# Patient Record
Sex: FEMALE | Race: WHITE | NOT HISPANIC OR LATINO | URBAN - METROPOLITAN AREA
[De-identification: names, ages, dates, MRNs, and addresses within clinical notes are randomized per-mention and may not be internally consistent; named-entity substitution may affect disease eponyms.]

---

## 2017-01-31 ENCOUNTER — OFFICE VISIT (OUTPATIENT)
Dept: URGENT CARE | Facility: MEDICAL CENTER | Age: 57
End: 2017-01-31
Payer: COMMERCIAL

## 2017-01-31 PROCEDURE — 99213 OFFICE O/P EST LOW 20 MIN: CPT

## 2017-05-23 ENCOUNTER — GENERIC CONVERSION - ENCOUNTER (OUTPATIENT)
Dept: OTHER | Facility: OTHER | Age: 57
End: 2017-05-23

## 2017-05-26 ENCOUNTER — GENERIC CONVERSION - ENCOUNTER (OUTPATIENT)
Dept: OTHER | Facility: OTHER | Age: 57
End: 2017-05-26

## 2017-06-08 ENCOUNTER — GENERIC CONVERSION - ENCOUNTER (OUTPATIENT)
Dept: OTHER | Facility: OTHER | Age: 57
End: 2017-06-08

## 2017-06-16 ENCOUNTER — GENERIC CONVERSION - ENCOUNTER (OUTPATIENT)
Dept: OTHER | Facility: OTHER | Age: 57
End: 2017-06-16

## 2023-11-08 ENCOUNTER — APPOINTMENT (OUTPATIENT)
Dept: RADIOLOGY | Facility: MEDICAL CENTER | Age: 63
End: 2023-11-08
Payer: COMMERCIAL

## 2023-11-08 ENCOUNTER — OFFICE VISIT (OUTPATIENT)
Dept: URGENT CARE | Facility: MEDICAL CENTER | Age: 63
End: 2023-11-08
Payer: COMMERCIAL

## 2023-11-08 VITALS
DIASTOLIC BLOOD PRESSURE: 80 MMHG | BODY MASS INDEX: 31.89 KG/M2 | SYSTOLIC BLOOD PRESSURE: 150 MMHG | HEIGHT: 63 IN | TEMPERATURE: 97.7 F | HEART RATE: 87 BPM | OXYGEN SATURATION: 97 % | RESPIRATION RATE: 18 BRPM | WEIGHT: 180 LBS

## 2023-11-08 DIAGNOSIS — S60.011A CONTUSION OF RIGHT THUMB WITHOUT DAMAGE TO NAIL, INITIAL ENCOUNTER: Primary | ICD-10-CM

## 2023-11-08 DIAGNOSIS — S69.91XA INJURY OF RIGHT THUMB, INITIAL ENCOUNTER: ICD-10-CM

## 2023-11-08 PROCEDURE — 73140 X-RAY EXAM OF FINGER(S): CPT

## 2023-11-08 PROCEDURE — 99213 OFFICE O/P EST LOW 20 MIN: CPT | Performed by: PHYSICIAN ASSISTANT

## 2023-11-08 RX ORDER — LOSARTAN POTASSIUM AND HYDROCHLOROTHIAZIDE 25; 100 MG/1; MG/1
1 TABLET ORAL DAILY
COMMUNITY

## 2023-11-08 RX ORDER — ESCITALOPRAM OXALATE 20 MG/1
20 TABLET ORAL DAILY
COMMUNITY
Start: 2023-10-07

## 2023-11-08 RX ORDER — ROSUVASTATIN CALCIUM 10 MG/1
TABLET, COATED ORAL
COMMUNITY

## 2023-11-08 RX ORDER — PANTOPRAZOLE SODIUM 40 MG/1
40 TABLET, DELAYED RELEASE ORAL DAILY
COMMUNITY
Start: 2023-10-19

## 2023-11-08 NOTE — PATIENT INSTRUCTIONS
Contusion right thumb  Rest, ice, elevate, over-the-counter pain medicine as needed  Follow-up with Ortho  Follow up with PCP in 3-5 days. Proceed to  ER if symptoms worsen.

## 2023-11-08 NOTE — PROGRESS NOTES
[facility]        NAME: Lloyd Lee is a 59 y.o. female  : 1960    MRN: [id number]  DATE: 2023  TIME: 5:50 PM    Assessment and Plan   Contusion of right thumb without damage to nail, initial encounter [S60.011A]  1. Contusion of right thumb without damage to nail, initial encounter        2. Injury of right thumb, initial encounter  XR thumb right first digit-min 2v            Patient Instructions     Contusion right thumb  Rest, ice, elevate, over-the-counter pain medicine as needed  Follow-up with Ortho  Follow up with PCP in 3-5 days. Proceed to  ER if symptoms worsen. Chief Complaint     Chief Complaint   Patient presents with    Hand Injury     Pt reports she was unlocking a wheel on a heavy smoker and it snapped down on her right thumb, pt reports seeing stars. Pt having 8/10 pain. Hx of lymph node removal on right arm. History of Present Illness       52-year-old female who presents complaining of pain to her right thumb. Patient states that she was trying to open a smoker and a sprain kicked back hitting her on the right thumb. Denies any other trauma. Hand Injury   Pertinent negatives include no chest pain. Review of Systems   Review of Systems   Constitutional: Negative. HENT: Negative. Eyes: Negative. Respiratory: Negative. Negative for cough, chest tightness, shortness of breath, wheezing and stridor. Cardiovascular: Negative. Negative for chest pain, palpitations and leg swelling. Musculoskeletal:  Positive for arthralgias.          Current Medications       Current Outpatient Medications:     escitalopram (LEXAPRO) 20 mg tablet, Take 20 mg by mouth daily, Disp: , Rfl:     losartan-hydrochlorothiazide (HYZAAR) 100-25 MG per tablet, Take 1 tablet by mouth daily, Disp: , Rfl:     pantoprazole (PROTONIX) 40 mg tablet, Take 40 mg by mouth daily, Disp: , Rfl:     rosuvastatin (CRESTOR) 10 MG tablet, TAKE 1 TABLET BY MOUTH EVERY DAY NIGHTLY, Disp: , Rfl:     Current Allergies     Allergies as of 11/08/2023 - Reviewed 11/08/2023   Allergen Reaction Noted    Penicillins Hives 11/08/2023            The following portions of the patient's history were reviewed and updated as appropriate: allergies, current medications, past family history, past medical history, past social history, past surgical history and problem list.     Past Medical History:   Diagnosis Date    Anxiety     Breast cancer ([address]) 1996    Depression     GERD (gastroesophageal reflux disease)     High cholesterol     Hypertension        Past Surgical History:   Procedure Laterality Date    BREAST SURGERY      HYSTERECTOMY W/ SALPINGO-OOPHERECTOMY      KNEE SURGERY Right     MASTECTOMY  1997    MOHS SURGERY      PARTIAL HYSTERECTOMY  2015    ROTATOR CUFF REPAIR Right        History reviewed. No pertinent family history. Medications have been verified. Objective   /80   Pulse 87   Temp 97.7 °F (36.5 °C) (Temporal)   Resp 18   Ht 5' 3" (1.6 m)   Wt 81.6 kg (180 lb)   SpO2 97%   BMI 31.89 kg/m²        Physical Exam     Physical Exam  Constitutional:       Appearance: Normal appearance. She is well-developed. HENT:      Head: Normocephalic and atraumatic. Right Ear: External ear normal.      Left Ear: External ear normal.      Nose: Nose normal.   Cardiovascular:      Rate and Rhythm: Normal rate and regular rhythm. Heart sounds: Normal heart sounds. Pulmonary:      Effort: Pulmonary effort is normal. No respiratory distress. Breath sounds: Normal breath sounds. No wheezing or rales. Chest:      Chest wall: No tenderness. Musculoskeletal:        Arms:       Cervical back: Normal range of motion and neck supple. Lymphadenopathy:      Cervical: No cervical adenopathy. Neurological:      Mental Status: She is alert.

## 2023-11-09 ENCOUNTER — TELEPHONE (OUTPATIENT)
Age: 63
End: 2023-11-09

## 2023-11-09 NOTE — TELEPHONE ENCOUNTER
Patient is being referred to a orthopedics. Please schedule accordingly.     [address]   [phone]

## 2023-12-26 ENCOUNTER — TELEPHONE (OUTPATIENT)
Age: 63
End: 2023-12-26

## 2023-12-26 NOTE — TELEPHONE ENCOUNTER
Caller: Patient    Doctor: Chad    Reason for call: Patient declined scheduling from 11/8 right thumb referral     Call back#: n/a

## 2024-04-18 ENCOUNTER — TELEPHONE (OUTPATIENT)
Age: 64
End: 2024-04-18

## 2024-04-18 ENCOUNTER — NURSE TRIAGE (OUTPATIENT)
Dept: PHYSICAL THERAPY | Facility: OTHER | Age: 64
End: 2024-04-18

## 2024-04-18 DIAGNOSIS — M54.41 CHRONIC BILATERAL LOW BACK PAIN WITH BILATERAL SCIATICA: Primary | ICD-10-CM

## 2024-04-18 DIAGNOSIS — G89.29 CHRONIC BILATERAL LOW BACK PAIN WITH BILATERAL SCIATICA: Primary | ICD-10-CM

## 2024-04-18 DIAGNOSIS — M54.42 CHRONIC BILATERAL LOW BACK PAIN WITH BILATERAL SCIATICA: Primary | ICD-10-CM

## 2024-04-18 NOTE — TELEPHONE ENCOUNTER
Additional Information   Negative: Has the patient had unexplained weight loss?   Negative: Does the patient have a fever?   Negative: Is the patient experiencing urine retention?   Negative: Is the patient experiencing acute drop foot or paralysis?   Negative: Has the patient experienced major trauma? (fall from height, high speed collision, direct blow to spine) and is also experiencing nausea, light-headedness, or loss of consciousness?   Negative: Is the patient experiencing blood in sputum?   Affirmative: Is this a chronic condition?    Protocols used: [facility] Protocol    Com[facility] Program was reviewed in detail and what we can provide for their back pain.  Patient is agreeable to being triaged and would like to proceed with Physical Therapy.    Referral was placed for Physical Therapy at the [place] site. Patients information was sent to the  to make evaluation appointment. Patient made aware that the PT office  will be calling to schedule the appointment.  Patient was provided with the phone number to the PT office.    No further questions and/or concerns were voiced by the patient at this time. Patient states understanding of the referral that was placed.
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
## 2024-04-18 NOTE — TELEPHONE ENCOUNTER
Caller: patient    Doctor/Office:     Call regarding :  to schedule appointment for lower back pain     Call was transferred to: triage nurse per decision tree

## 2025-02-10 ENCOUNTER — HOSPITAL ENCOUNTER (OUTPATIENT)
Facility: HOSPITAL | Age: 65
Setting detail: OBSERVATION
Discharge: HOME | End: 2025-02-11
Attending: SURGERY | Admitting: SURGERY
Payer: COMMERCIAL

## 2025-02-10 ENCOUNTER — APPOINTMENT (EMERGENCY)
Dept: RADIOLOGY | Facility: HOSPITAL | Age: 65
End: 2025-02-10
Payer: COMMERCIAL

## 2025-02-10 ENCOUNTER — APPOINTMENT (EMERGENCY)
Dept: CT IMAGING | Facility: HOSPITAL | Age: 65
End: 2025-02-10
Payer: COMMERCIAL

## 2025-02-10 DIAGNOSIS — S09.90XA TRAUMATIC INJURY OF HEAD, INITIAL ENCOUNTER: Primary | ICD-10-CM

## 2025-02-10 LAB
ABO GROUP BLD: NORMAL
ABO GROUP BLD: NORMAL
ANION GAP SERPL CALCULATED.3IONS-SCNC: 11 MMOL/L (ref 4–13)
APTT PPP: >210 SECONDS (ref 23–34)
BASOPHILS # BLD AUTO: 0.04 THOUSANDS/ΜL (ref 0–0.1)
BASOPHILS NFR BLD AUTO: 1 % (ref 0–1)
BLD GP AB SCN SERPL QL: NEGATIVE
BUN SERPL-MCNC: 14 MG/DL (ref 5–25)
CALCIUM SERPL-MCNC: 10.1 MG/DL (ref 8.4–10.2)
CHLORIDE SERPL-SCNC: 102 MMOL/L (ref 96–108)
CO2 SERPL-SCNC: 24 MMOL/L (ref 21–32)
CREAT SERPL-MCNC: 0.73 MG/DL (ref 0.6–1.3)
EOSINOPHIL # BLD AUTO: 0.17 THOUSAND/ΜL (ref 0–0.61)
EOSINOPHIL NFR BLD AUTO: 3 % (ref 0–6)
ERYTHROCYTE [DISTWIDTH] IN BLOOD BY AUTOMATED COUNT: 12.8 % (ref 11.6–15.1)
GFR SERPL CREATININE-BSD FRML MDRD: 87 ML/MIN/1.73SQ M
GLUCOSE SERPL-MCNC: 103 MG/DL (ref 65–140)
HCT VFR BLD AUTO: 41.5 % (ref 34.8–46.1)
HGB BLD-MCNC: 14.4 G/DL (ref 11.5–15.4)
IMM GRANULOCYTES # BLD AUTO: 0.04 THOUSAND/UL (ref 0–0.2)
IMM GRANULOCYTES NFR BLD AUTO: 1 % (ref 0–2)
INR PPP: 0.97 (ref 0.85–1.19)
LYMPHOCYTES # BLD AUTO: 2.03 THOUSANDS/ΜL (ref 0.6–4.47)
LYMPHOCYTES NFR BLD AUTO: 30 % (ref 14–44)
MCH RBC QN AUTO: 30.4 PG (ref 26.8–34.3)
MCHC RBC AUTO-ENTMCNC: 34.7 G/DL (ref 31.4–37.4)
MCV RBC AUTO: 88 FL (ref 82–98)
MONOCYTES # BLD AUTO: 0.63 THOUSAND/ΜL (ref 0.17–1.22)
MONOCYTES NFR BLD AUTO: 9 % (ref 4–12)
NEUTROPHILS # BLD AUTO: 3.91 THOUSANDS/ΜL (ref 1.85–7.62)
NEUTS SEG NFR BLD AUTO: 56 % (ref 43–75)
NRBC BLD AUTO-RTO: 0 /100 WBCS
PLATELET # BLD AUTO: 222 THOUSANDS/UL (ref 149–390)
PMV BLD AUTO: 10.5 FL (ref 8.9–12.7)
POTASSIUM SERPL-SCNC: 5.3 MMOL/L (ref 3.5–5.3)
PROTHROMBIN TIME: 13.6 SECONDS (ref 12.3–15)
PROTHROMBIN TIME: >120 SECONDS (ref 12.3–15)
RBC # BLD AUTO: 4.74 MILLION/UL (ref 3.81–5.12)
RH BLD: POSITIVE
RH BLD: POSITIVE
SODIUM SERPL-SCNC: 137 MMOL/L (ref 135–147)
SPECIMEN EXPIRATION DATE: NORMAL
WBC # BLD AUTO: 6.82 THOUSAND/UL (ref 4.31–10.16)

## 2025-02-10 PROCEDURE — 84295 ASSAY OF SERUM SODIUM: CPT

## 2025-02-10 PROCEDURE — 85014 HEMATOCRIT: CPT

## 2025-02-10 PROCEDURE — 82803 BLOOD GASES ANY COMBINATION: CPT

## 2025-02-10 PROCEDURE — 86901 BLOOD TYPING SEROLOGIC RH(D): CPT | Performed by: SURGERY

## 2025-02-10 PROCEDURE — 99284 EMERGENCY DEPT VISIT MOD MDM: CPT

## 2025-02-10 PROCEDURE — 86850 RBC ANTIBODY SCREEN: CPT | Performed by: SURGERY

## 2025-02-10 PROCEDURE — 85025 COMPLETE CBC W/AUTO DIFF WBC: CPT | Performed by: SURGERY

## 2025-02-10 PROCEDURE — 36415 COLL VENOUS BLD VENIPUNCTURE: CPT | Performed by: SURGERY

## 2025-02-10 PROCEDURE — EDAIR PR ED AIR: Performed by: EMERGENCY MEDICINE

## 2025-02-10 PROCEDURE — 82947 ASSAY GLUCOSE BLOOD QUANT: CPT

## 2025-02-10 PROCEDURE — 96374 THER/PROPH/DIAG INJ IV PUSH: CPT

## 2025-02-10 PROCEDURE — 76705 ECHO EXAM OF ABDOMEN: CPT

## 2025-02-10 PROCEDURE — 85730 THROMBOPLASTIN TIME PARTIAL: CPT | Performed by: SURGERY

## 2025-02-10 PROCEDURE — 84132 ASSAY OF SERUM POTASSIUM: CPT

## 2025-02-10 PROCEDURE — 93308 TTE F-UP OR LMTD: CPT

## 2025-02-10 PROCEDURE — 86900 BLOOD TYPING SEROLOGIC ABO: CPT | Performed by: SURGERY

## 2025-02-10 PROCEDURE — 99223 1ST HOSP IP/OBS HIGH 75: CPT | Performed by: SURGERY

## 2025-02-10 PROCEDURE — 85610 PROTHROMBIN TIME: CPT | Performed by: NURSE PRACTITIONER

## 2025-02-10 PROCEDURE — 71045 X-RAY EXAM CHEST 1 VIEW: CPT

## 2025-02-10 PROCEDURE — 85610 PROTHROMBIN TIME: CPT | Performed by: SURGERY

## 2025-02-10 PROCEDURE — 82330 ASSAY OF CALCIUM: CPT

## 2025-02-10 PROCEDURE — 70450 CT HEAD/BRAIN W/O DYE: CPT

## 2025-02-10 PROCEDURE — 72125 CT NECK SPINE W/O DYE: CPT

## 2025-02-10 PROCEDURE — 80048 BASIC METABOLIC PNL TOTAL CA: CPT | Performed by: SURGERY

## 2025-02-10 RX ORDER — ALPRAZOLAM 0.25 MG/1
0.5 TABLET ORAL ONCE
Status: COMPLETED | OUTPATIENT
Start: 2025-02-10 | End: 2025-02-10

## 2025-02-10 RX ORDER — FENTANYL CITRATE 50 UG/ML
1 INJECTION, SOLUTION INTRAMUSCULAR; INTRAVENOUS ONCE
Refills: 0 | Status: COMPLETED | OUTPATIENT
Start: 2025-02-10 | End: 2025-02-10

## 2025-02-10 RX ORDER — ENOXAPARIN SODIUM 100 MG/ML
40 INJECTION SUBCUTANEOUS EVERY 12 HOURS
Status: DISCONTINUED | OUTPATIENT
Start: 2025-02-10 | End: 2025-02-11

## 2025-02-10 RX ORDER — ACETAMINOPHEN 325 MG/1
975 TABLET ORAL EVERY 8 HOURS SCHEDULED
Status: DISCONTINUED | OUTPATIENT
Start: 2025-02-10 | End: 2025-02-11 | Stop reason: HOSPADM

## 2025-02-10 RX ORDER — POLYETHYLENE GLYCOL 3350 17 G/17G
17 POWDER, FOR SOLUTION ORAL DAILY
Status: DISCONTINUED | OUTPATIENT
Start: 2025-02-10 | End: 2025-02-11 | Stop reason: HOSPADM

## 2025-02-10 RX ORDER — DOCUSATE SODIUM 100 MG/1
100 CAPSULE, LIQUID FILLED ORAL 2 TIMES DAILY
Status: DISCONTINUED | OUTPATIENT
Start: 2025-02-10 | End: 2025-02-11 | Stop reason: HOSPADM

## 2025-02-10 RX ORDER — ASPIRIN 325 MG
325 TABLET ORAL DAILY
COMMUNITY

## 2025-02-10 RX ORDER — MAGNESIUM SULFATE HEPTAHYDRATE 40 MG/ML
2 INJECTION, SOLUTION INTRAVENOUS ONCE
Status: COMPLETED | OUTPATIENT
Start: 2025-02-10 | End: 2025-02-10

## 2025-02-10 RX ORDER — LIDOCAINE HYDROCHLORIDE 10 MG/ML
10 INJECTION, SOLUTION EPIDURAL; INFILTRATION; INTRACAUDAL; PERINEURAL ONCE
Status: DISCONTINUED | OUTPATIENT
Start: 2025-02-10 | End: 2025-02-11 | Stop reason: HOSPADM

## 2025-02-10 RX ORDER — ONDANSETRON 2 MG/ML
4 INJECTION INTRAMUSCULAR; INTRAVENOUS EVERY 6 HOURS PRN
Status: DISCONTINUED | OUTPATIENT
Start: 2025-02-10 | End: 2025-02-11 | Stop reason: HOSPADM

## 2025-02-10 RX ORDER — FENTANYL CITRATE 50 UG/ML
25 INJECTION, SOLUTION INTRAMUSCULAR; INTRAVENOUS ONCE
Refills: 0 | Status: COMPLETED | OUTPATIENT
Start: 2025-02-10 | End: 2025-02-10

## 2025-02-10 RX ORDER — MECLIZINE HYDROCHLORIDE 25 MG/1
25 TABLET ORAL 3 TIMES DAILY PRN
Status: DISCONTINUED | OUTPATIENT
Start: 2025-02-10 | End: 2025-02-11 | Stop reason: HOSPADM

## 2025-02-10 RX ORDER — METHOCARBAMOL 500 MG/1
500 TABLET, FILM COATED ORAL EVERY 6 HOURS SCHEDULED
Status: DISCONTINUED | OUTPATIENT
Start: 2025-02-10 | End: 2025-02-11 | Stop reason: HOSPADM

## 2025-02-10 RX ORDER — SENNOSIDES 8.6 MG
2 TABLET ORAL DAILY
Status: DISCONTINUED | OUTPATIENT
Start: 2025-02-10 | End: 2025-02-11 | Stop reason: HOSPADM

## 2025-02-10 RX ORDER — KETOROLAC TROMETHAMINE 30 MG/ML
15 INJECTION, SOLUTION INTRAMUSCULAR; INTRAVENOUS ONCE
Status: COMPLETED | OUTPATIENT
Start: 2025-02-10 | End: 2025-02-10

## 2025-02-10 RX ADMIN — METHOCARBAMOL TABLETS 500 MG: 500 TABLET, COATED ORAL at 14:56

## 2025-02-10 RX ADMIN — ALPRAZOLAM 0.5 MG: 0.25 TABLET ORAL at 16:03

## 2025-02-10 RX ADMIN — ACETAMINOPHEN 975 MG: 325 TABLET, FILM COATED ORAL at 14:56

## 2025-02-10 RX ADMIN — METHOCARBAMOL TABLETS 500 MG: 500 TABLET, COATED ORAL at 22:01

## 2025-02-10 RX ADMIN — ACETAMINOPHEN 975 MG: 325 TABLET, FILM COATED ORAL at 22:01

## 2025-02-10 RX ADMIN — FENTANYL CITRATE 25 MCG: 50 INJECTION INTRAMUSCULAR; INTRAVENOUS at 14:56

## 2025-02-10 RX ADMIN — KETOROLAC TROMETHAMINE 15 MG: 30 INJECTION, SOLUTION INTRAMUSCULAR; INTRAVENOUS at 20:00

## 2025-02-10 RX ADMIN — MAGNESIUM SULFATE IN WATER FOR 2 G: 40 INJECTION INTRAVENOUS at 20:00

## 2025-02-10 NOTE — PLAN OF CARE
Problem: PAIN - ADULT  Goal: Verbalizes/displays adequate comfort level or baseline comfort level  Description: Interventions:  - Encourage patient to monitor pain and request assistance  - Assess pain using appropriate pain scale  - Administer analgesics based on type and severity of pain and evaluate response  - Implement non-pharmacological measures as appropriate and evaluate response  - Consider cultural and social influences on pain and pain management  - Notify physician/advanced practitioner if interventions unsuccessful or patient reports new pain  Outcome: Progressing     Problem: INFECTION - ADULT  Goal: Absence or prevention of progression during hospitalization  Description: INTERVENTIONS:  - Assess and monitor for signs and symptoms of infection  - Monitor lab/diagnostic results  - Monitor all insertion sites, i.e. indwelling lines, tubes, and drains  - Monitor endotracheal if appropriate and nasal secretions for changes in amount and color  - [place] appropriate cooling/warming therapies per order  - Administer medications as ordered  - Instruct and encourage patient and family to use good hand hygiene technique  - Identify and instruct in appropriate isolation precautions for identified infection/condition  Outcome: Progressing  Goal: Absence of fever/infection during neutropenic period  Description: INTERVENTIONS:  - Monitor WBC    Outcome: Progressing
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
## 2025-02-10 NOTE — PROCEDURES
POC FAST US    Date/Time: 2/10/2025 2:30 PM    Performed by: Margarita Peterson PA-C  Authorized by: Margarita Peterson PA-C    Patient location:  Trauma  Procedure details:     Exam Type:  Diagnostic    Indications comment:  Level B trauma    Assess for:  Intra-abdominal fluid and pericardial effusion    Technique: FAST      Views obtained:  Heart - Pericardial sac, RUQ - Darling's Pouch, Suprapubic - Pouch of Phuc and LUQ - Splenorenal space    Image quality: diagnostic      Image availability:  Images available in PACS  FAST Findings:     RUQ (Hepatorenal) free fluid: absent      LUQ (Splenorenal) free fluid: absent      Suprapubic free fluid: absent      Cardiac wall motion: identified      Pericardial effusion: absent    Interpretation:     Impressions: negative
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
## 2025-02-11 VITALS
OXYGEN SATURATION: 96 % | SYSTOLIC BLOOD PRESSURE: 126 MMHG | HEART RATE: 77 BPM | TEMPERATURE: 97.5 F | RESPIRATION RATE: 12 BRPM | WEIGHT: 170 LBS | HEIGHT: 63 IN | BODY MASS INDEX: 30.12 KG/M2 | DIASTOLIC BLOOD PRESSURE: 65 MMHG

## 2025-02-11 PROBLEM — S09.90XA HEAD TRAUMA: Status: ACTIVE | Noted: 2025-02-11

## 2025-02-11 PROBLEM — S06.0XAA CONCUSSION: Status: ACTIVE | Noted: 2025-02-11

## 2025-02-11 PROBLEM — S00.03XA TRAUMATIC HEMATOMA OF SCALP: Status: ACTIVE | Noted: 2025-02-11

## 2025-02-11 LAB
ANION GAP SERPL CALCULATED.3IONS-SCNC: 8 MMOL/L (ref 4–13)
BASOPHILS # BLD AUTO: 0.04 THOUSANDS/ΜL (ref 0–0.1)
BASOPHILS NFR BLD AUTO: 1 % (ref 0–1)
BUN SERPL-MCNC: 20 MG/DL (ref 5–25)
CALCIUM SERPL-MCNC: 9.3 MG/DL (ref 8.4–10.2)
CHLORIDE SERPL-SCNC: 102 MMOL/L (ref 96–108)
CO2 SERPL-SCNC: 29 MMOL/L (ref 21–32)
CREAT SERPL-MCNC: 0.79 MG/DL (ref 0.6–1.3)
EOSINOPHIL # BLD AUTO: 0.21 THOUSAND/ΜL (ref 0–0.61)
EOSINOPHIL NFR BLD AUTO: 4 % (ref 0–6)
ERYTHROCYTE [DISTWIDTH] IN BLOOD BY AUTOMATED COUNT: 12.7 % (ref 11.6–15.1)
GFR SERPL CREATININE-BSD FRML MDRD: 79 ML/MIN/1.73SQ M
GLUCOSE SERPL-MCNC: 130 MG/DL (ref 65–140)
HCT VFR BLD AUTO: 38.7 % (ref 34.8–46.1)
HGB BLD-MCNC: 13 G/DL (ref 11.5–15.4)
IMM GRANULOCYTES # BLD AUTO: 0.02 THOUSAND/UL (ref 0–0.2)
IMM GRANULOCYTES NFR BLD AUTO: 0 % (ref 0–2)
LYMPHOCYTES # BLD AUTO: 2.07 THOUSANDS/ΜL (ref 0.6–4.47)
LYMPHOCYTES NFR BLD AUTO: 38 % (ref 14–44)
MCH RBC QN AUTO: 29.6 PG (ref 26.8–34.3)
MCHC RBC AUTO-ENTMCNC: 33.6 G/DL (ref 31.4–37.4)
MCV RBC AUTO: 88 FL (ref 82–98)
MONOCYTES # BLD AUTO: 0.55 THOUSAND/ΜL (ref 0.17–1.22)
MONOCYTES NFR BLD AUTO: 10 % (ref 4–12)
NEUTROPHILS # BLD AUTO: 2.61 THOUSANDS/ΜL (ref 1.85–7.62)
NEUTS SEG NFR BLD AUTO: 47 % (ref 43–75)
NRBC BLD AUTO-RTO: 0 /100 WBCS
PLATELET # BLD AUTO: 213 THOUSANDS/UL (ref 149–390)
PMV BLD AUTO: 10.8 FL (ref 8.9–12.7)
POTASSIUM SERPL-SCNC: 3.5 MMOL/L (ref 3.5–5.3)
RBC # BLD AUTO: 4.39 MILLION/UL (ref 3.81–5.12)
SODIUM SERPL-SCNC: 139 MMOL/L (ref 135–147)
WBC # BLD AUTO: 5.5 THOUSAND/UL (ref 4.31–10.16)

## 2025-02-11 PROCEDURE — NC001 PR NO CHARGE: Performed by: SURGERY

## 2025-02-11 PROCEDURE — 85025 COMPLETE CBC W/AUTO DIFF WBC: CPT | Performed by: NURSE PRACTITIONER

## 2025-02-11 PROCEDURE — 97163 PT EVAL HIGH COMPLEX 45 MIN: CPT

## 2025-02-11 PROCEDURE — 99238 HOSP IP/OBS DSCHRG MGMT 30/<: CPT

## 2025-02-11 PROCEDURE — 97129 THER IVNTJ 1ST 15 MIN: CPT

## 2025-02-11 PROCEDURE — 80048 BASIC METABOLIC PNL TOTAL CA: CPT | Performed by: NURSE PRACTITIONER

## 2025-02-11 PROCEDURE — 97167 OT EVAL HIGH COMPLEX 60 MIN: CPT

## 2025-02-11 RX ORDER — METHOCARBAMOL 500 MG/1
500 TABLET, FILM COATED ORAL EVERY 6 HOURS PRN
Qty: 30 TABLET | Refills: 0 | Status: SHIPPED | OUTPATIENT
Start: 2025-02-11

## 2025-02-11 RX ORDER — MECLIZINE HYDROCHLORIDE 25 MG/1
25 TABLET ORAL 3 TIMES DAILY PRN
Qty: 30 TABLET | Refills: 0 | Status: SHIPPED | OUTPATIENT
Start: 2025-02-11

## 2025-02-11 RX ORDER — ACETAMINOPHEN 325 MG/1
975 TABLET ORAL EVERY 8 HOURS PRN
Start: 2025-02-11

## 2025-02-11 RX ORDER — ENOXAPARIN SODIUM 100 MG/ML
30 INJECTION SUBCUTANEOUS EVERY 12 HOURS
Status: DISCONTINUED | OUTPATIENT
Start: 2025-02-11 | End: 2025-02-11 | Stop reason: HOSPADM

## 2025-02-11 RX ADMIN — ENOXAPARIN SODIUM 40 MG: 40 INJECTION SUBCUTANEOUS at 02:57

## 2025-02-11 RX ADMIN — ACETAMINOPHEN 975 MG: 325 TABLET, FILM COATED ORAL at 06:11

## 2025-02-11 RX ADMIN — METHOCARBAMOL TABLETS 500 MG: 500 TABLET, COATED ORAL at 02:57

## 2025-02-11 RX ADMIN — METHOCARBAMOL TABLETS 500 MG: 500 TABLET, COATED ORAL at 08:34
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
## 2025-02-11 NOTE — PLAN OF CARE
Problem: OCCUPATIONAL THERAPY ADULT  Goal: Performs self-care activities at highest level of function for planned discharge setting.  See evaluation for individualized goals.  Description: Treatment Interventions: Continued evaluation, ADL retraining, Patient/family training (cognitive assessment)          See flowsheet documentation for full assessment, interventions and recommendations.   Note: Limitation: Decreased ADL status, Decreased high-level ADLs (+ pain, activity tolerance, ? memory,)  Prognosis: Good  Assessment: Patient is a 64 y.o. female seen for OT evaluation at [facility] following admission on 2/10/2025  s/p head injury. Please see above for comprehensive list of comorbidities and significant PMHx impacting functional performance.  Upon initial evaluation, pt appears to be performing NEAR baseline functional status.   Occupational performance is affected by the following deficits: decreased balance , decreased activity tolerance , impaired memory , and (+) pain . Personal/Environmental factors impacting D/C include: NWB status to LLE. Supporting factors include: supportive family with patient at all times for assistance.  Patient would benefit from OT services within the acute care setting to maximize level of functional independence in the following areas self-care transfers, functional mobility, and ADLs.  From OT standpoint, recommendation at time of D/C would be Level 3: minimum resource intensity  for cognitive therapy for any residual concussion symptoms.     Rehab Resource Intensity Level, OT: (S) III (Minimum Resource Intensity) (no immediate OT needs, however pt may benefit from OP cog for post concussion management if symptoms do not improve)        Sonja Weinstein, OT

## 2025-02-11 NOTE — DISCHARGE INSTR - AVS FIRST PAGE
Concussion Discharge Instructions    At Home:  Most people feel better within the first 72 hours. However, an isolated concussion typically takes two weeks to heal and recover.  Further concussions while symptomatic / recovering from an initial concussion may cause prolonged recovery and worsened symptoms. As such, please limit interactions and activities that would cause increased exposure to another head injury.  Allow for uninterrupted sleep. Frequent wake-up checks are not encouraged and may prolong the recovery process.   Keep surroundings calm and quiet.  Diet:  You may resume your normal diet as tolerated. Some decrease in appetite and/or nausea/vomiting is not uncommon.  You may return to your normal activity. However, if symptoms develop or worsen, stop the activity and rest until symptoms have resolved.    At Work/Activities:   Exercise: 20-30 minutes of light cardiovascular exercise (walking, stationary bike) is permitted daily. If symptoms worsen with activity, rest for 24 hour prior to additional exercise.   No team activities or swimming until cleared by trauma. No return to sports (contact and non-contact) until cleared by trauma.   No driving until cleared by trauma.   If Physical Therapy or Occupational Therapy has been prescribed, please call on day of discharge to begin therapy as soon as possible.     Call the trauma office or return to the ER if you are experiencing:  Severe headaches  Blurry or double vision  Dizziness  Nausea  Vomiting    Please call the trauma office with any questions or concerns regarding the care plan.

## 2025-02-11 NOTE — PLAN OF CARE
Problem: PAIN - ADULT  Goal: Verbalizes/displays adequate comfort level or baseline comfort level  Description: Interventions:  - Encourage patient to monitor pain and request assistance  - Assess pain using appropriate pain scale  - Administer analgesics based on type and severity of pain and evaluate response  - Implement non-pharmacological measures as appropriate and evaluate response  - Consider cultural and social influences on pain and pain management  - Notify physician/advanced practitioner if interventions unsuccessful or patient reports new pain  Outcome: Progressing     Problem: INFECTION - ADULT  Goal: Absence or prevention of progression during hospitalization  Description: INTERVENTIONS:  - Assess and monitor for signs and symptoms of infection  - Monitor lab/diagnostic results  - Monitor all insertion sites, i.e. indwelling lines, tubes, and drains  - Monitor endotracheal if appropriate and nasal secretions for changes in amount and color  - [place] appropriate cooling/warming therapies per order  - Administer medications as ordered  - Instruct and encourage patient and family to use good hand hygiene technique  - Identify and instruct in appropriate isolation precautions for identified infection/condition  Outcome: Progressing  Goal: Absence of fever/infection during neutropenic period  Description: INTERVENTIONS:  - Monitor WBC    Outcome: Progressing
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
## 2025-02-11 NOTE — OCCUPATIONAL THERAPY NOTE
Occupational Therapy Evaluation + Cognitive Evaluation (MoCA v8.2)     Patient Name: Elo Mcfarlane  Today's Date: 2/11/2025  Problem List  Active Problems:    Head trauma    Traumatic hematoma of scalp    Past Medical History  Past Medical History:   Diagnosis Date    Anxiety     Breast cancer (HCC) 1996    Depression     GERD (gastroesophageal reflux disease)     High cholesterol     Hypertension      Past Surgical History  Past Surgical History:   Procedure Laterality Date    BREAST SURGERY      HYSTERECTOMY W/ SALPINGO-OOPHERECTOMY      KNEE SURGERY Right     MASTECTOMY  1997    MOHS SURGERY      PARTIAL HYSTERECTOMY  2015    ROTATOR CUFF REPAIR Right            02/11/25 1015   OT Last Visit   OT Visit Date 02/11/25   Note Type   Note type Evaluation   Pain Assessment   Pain Assessment Tool 0-10   Pain Score 5   Pain Location/Orientation Location: Head   Pain Onset/Description Descriptor: Headache   Restrictions/Precautions   Weight Bearing Precautions Per Order Yes   LLE Weight Bearing Per Order NWB  (s/p Repair of peroneal tendon tear left foot and ankle 1/31 at [facility])   Braces or Orthoses Splint  (LLE posterior ankle splint)   Other Precautions WBS;Bed Alarm;Chair Alarm;Fall Risk;Pain  (+ concussion sx)   Home Living   Type of Home House   Home Layout Two level;1/2 bath on main level;Stairs to enter with rails  (1 +1 JORDAN, FOS to 2nd floor bed and full bathroom)   Bathroom Shower/Tub Walk-in shower   Bathroom Toilet Standard   Bathroom Equipment Shower chair   Bathroom Accessibility Accessible  (accessible)   Home Equipment Walker  (knee scooter , RW)   Additional Comments uses RW in bathroom to assist with toilet transfers.   Prior Function   Level of [place] Independent with ADLs;Independent with functional mobility   Lives With Spouse  (children and grandchildren)   Receives Help From Family   IADLs Independent with meal prep;Independent with medication management;Independent with driving  "  Falls in the last 6 months 0   Vocational Full time employment  (realtor)   Comments has been using knee scooter post op and bumping down stairs vs crawling up. reports negiating FOS 2x per day (comes down in morning and up in the evening)   Lifestyle   Autonomy Pt is (I) c ADLs, IADLs, mod (I) c knee scooter s/p recent LLE surgery. + driving + working. (-) falls   Reciprocal Relationships spouse   Service to Others retired   Intrinsic Gratification being independent.   General   Additional Pertinent History Pt admitted resulting in head truma and laceration after incident with bricks and awning falling onto pt's head. Complicated by recent tendon repair sx of LLE , pt NWB in posterior splint.   Family/Caregiver Present Yes  (spouse present)   Subjective   Subjective \"I cant believe this happened\"   ADL   Where Assessed   (toilet, EOB, recliner)   Eating Assistance 7  Independent   Grooming Assistance 7  Independent   Grooming Deficit   (washed hands at sink resting LE on knee scooter.)   UB Bathing Assistance 6  Modified Independent   LB Bathing Assistance 5  Supervision/Setup   UB Dressing Assistance 6  Modified independent   LB Dressing Assistance 5  Supervision/Setup   Toileting Assistance  5  Supervision/Setup   Toileting Deficit Grab bar use;Supervison/safety;Increased time to complete;Setup  (posterior care, clothing management all completed c supervision. use of grab bar)   Functional Assistance 5  Supervision/Setup   Additional Comments Appropriate safety awareness demonstrated during ADLs   Bed Mobility   Supine to Sit 6  Modified independent   Additional items HOB elevated   Sit to Supine   (seated OOB in recliner)   Additional Comments (-) lightheaded/dizziness c positional changes but endorses worsening headache with activity   Transfers   Sit to Stand 5  Supervision   Additional items Assist x 1;Increased time required  (onto knee scooter)   Stand to Sit 5  Supervision  (from knee scooter onto " recliner)   Additional items Armrests;Increased time required;Verbal cues   Toilet transfer 5  Supervision   Additional items Increased time required;Standard toilet;Assist x 1  (use of grab bar. transferred on/off knee scooter <>toilet)   Additional Comments cues for self pacing at times. no LOB noted   Functional Mobility   Functional Mobility 5  Supervision   Additional Comments household distances within room, declines hallway mobility d/t light sensitivity. multiple directional changes c knee scooter without LOB, mildly impulsive   Additional items   (personal knee scooter)   Balance   Static Sitting Good   Dynamic Sitting Good   Static Standing Fair +   Activity Tolerance   Activity Tolerance Patient tolerated treatment well   Medical Staff Made Aware Care coordination c PT Flores.  CM Harvey   Nurse Made Aware SUBHASH Herrera pre/post   RUE Assessment   RUE Assessment WNL   LUE Assessment   LUE Assessment WNL   Hand Function   Gross Motor Coordination Functional   Fine Motor Coordination Functional   Sensation   Light Touch No apparent deficits   Vision-Basic Assessment   Patient Visual Report Eye fatigue/eye pain/headache;Nausea/blurring vision with head movement  (+photophobia, denies blurred vision and diplopia.)   Vision - Complex Assessment   Additional Comments reports dizziness c positional head changes, photosensitivity, and ongoing headache.   Cognition   Overall Cognitive Status WFL   Arousal/Participation Alert;Cooperative   Attention Within functional limits   Orientation Level Oriented X4   Memory Within functional limits   Following Commands Follows all commands and directions without difficulty   Comments Pt agreeable to OT session. endorses some s/s of concussion, however appropriate throughout conversation, no attention deficits noted. See tx session following IE for MoCA formal cog assessment   Assessment   Limitation Decreased ADL status;Decreased high-level ADLs  (+ pain, activity tolerance, ?  memory,)   Prognosis Good   Assessment Patient is a 64 y.o. female seen for OT [facility] at [facility] following admission on 2/10/2025  s/p head injury. Please see above for comprehensive list of comorbidities and significant PMHx impacting functional performance.  Upon initial evaluation, pt appears to be performing NEAR baseline functional status.   Occupational performance is affected by the following deficits: decreased balance , decreased activity tolerance , impaired memory , and (+) pain . Personal/Environmental factors impacting D/C include: NWB status to LLE. Supporting factors include: supportive family with patient at all times for assistance.  Patient would benefit from OT services within the acute care setting to maximize level of functional independence in the following areas self-care transfers, functional mobility, and ADLs.  From OT standpoint, recommendation at time of D/C would be Level 3: minimum resource intensity  for cognitive therapy for any residual concussion symptoms.   Goals   Patient Goals for headache to improve, to DC home   Plan   Treatment Interventions Continued evaluation;ADL retraining;Patient/family training  (cognitive assessment)   Goal Expiration Date 02/21/25   OT Treatment Day 0   OT Frequency 1-2x/wk   Discharge Recommendation   Rehab Resource Intensity Level, OT (S)  III (Minimum Resource Intensity)  (no immediate OT needs, however pt may benefit from OP cog for post concussion management if symptoms do not improve)   Additional Comments  The patient's raw score on the AM-PAC Daily Activity Inpatient Short Form is 20. A raw score of greater than or equal to 19 suggests the patient may benefit from discharge to home. Please refer to the recommendation of the Occupational Therapist for safe discharge planning.   AM-PAC Daily Activity Inpatient   Lower Body Dressing 3   Bathing 3   Toileting 3   Upper Body Dressing 3   Grooming 4   Eating 4   Daily Activity  "Raw Score 20   Daily Activity Standardized Score (Calc for Raw Score >=11) 42.03   AM-PAC Applied Cognition Inpatient   Following a Speech/Presentation 4   Understanding Ordinary Conversation 4   Taking Medications 3   Remembering Where Things Are Placed or Put Away 4   Remembering List of 4-5 Errands 3   Taking Care of Complicated Tasks 3   Applied Cognition Raw Score 21   Applied Cognition Standardized Score 44.3   MOCA   Version 8.2  (CERTIFIED RATER ID: [id number])   Visuopatial/Executive 4   Naming 3   Memory 0   Attention: Digits 2   Attention: Letters 1   Attention: Serial 3   Language: Repeat 2   Language: Fluency 1  (11)   Abstraction 1   Delayed Recall 4  (MIS 14/15)   Orientation 6   Does patient have less than or equal to 12 years of education? 0   MOCA Total Score 27   MOCA Comments (S)  Testing completed in quiet, well lit environment with minimal distractions. Pt with increased retrieval time for memory components but only requires 1 cue after increased time.  Pt received score of 27/30 indicating performance consistent with \"normal cognition\". Results reviewed with both pt and spouse, education provided re: symptom monitoring for concussions. Encouraged follow up with OP cognitive therapy for any residual symptoms. Verbalized understanding , all questions answered   Additional Treatment Session   Start Time 1034   End Time 1050   Treatment Assessment Pt participated in additional tx session following IE with intervention focus on formal cognitive evaluation (MoCA). See MOCA section above for details and educated areas.   Additional Treatment Day 1   End of Consult   Education Provided Yes   Patient Position at End of Consult Bed/Chair alarm activated;All needs within reach;Bedside chair   Nurse Communication Nurse aware of consult   Goals established on initial evaluation in order to achieve pt's goal of reducing headache and Dcing home.      Pt will complete LB ADLs Mod independent   for " increased ADL independence within 10 days.     Pt will complete toileting Mod independent   with use of DME for increased ADL independence within 10 days.     Pt will demonstrate proper body mechanics to complete self-care transfers and functional mobility with Mod independent  c use of knee scooter for increased safety and functional independence within 10 days.     Pt will demonstrate proper body mechanics and fall prevention strategies during 100% of tx sessions for increased safety awareness during ADL/IADLs      Pt will participate in ongoing cognitive assessments to assist with safe D/C planning and supervision/assistance recommendations. (MET)    Pt benefited from co-session of skilled OT and PT therapists in order to most appropriately address functional deficits d/t decreased activity tolerance.  OT/PT objectives were addressed separately; please see PT note for specific goal areas targeted.     Sonja Weinstein, OT

## 2025-02-11 NOTE — PROGRESS NOTES
Progress Note - Trauma   Name: Elo Mcfarlane 64 y.o. female I MRN: [id number]  Unit/Bed#: W -01 I Date of Admission: 2/10/2025   Date of Service: 2/11/2025 I Hospital Day: 0    Assessment & Plan  Head trauma  Level B trauma alert for multiple bricks and awning falling on posterior aspect of patients head, on daily ASA  - CTH negative   - Posterior scalp hematoma on exam, multiple small abrasions to posterior scalp and neck, no lacerations noted  - Concussive symptoms with dizziness, light sensitivity and headache following accident - continue meclizine TID, tylenol and Robaxin   - patient reported allergy/sensitivity to opioid pain medications  - Will obtain cognitive evaluation  - Consider steroids if concussive symptoms/headache persist  - DVT ppx - Lovenox  Traumatic hematoma of scalp  - Sustained with multiple bricks falling on head  - CTH negative  - Multiple small posterior scalp and neck abrasions, no lacerations identified  - Wound care as indicated    VTE Prophylaxis: Enoxaparin (Lovenox)     Disposition: Pending cognitive evaluation for assessment of concussive symptoms. Please contact the SecureChat role for the Trauma service with any questions/concerns.    24 Hour Events : no acute events overnight  Subjective : patient endorses a moderate headache, dizziness and light sensitivity since the accident that has been relieved by scheduled tylenol, robaxin and meclizine. Endorses neck soreness. Denies any other complaints at this time. Denies chest pain, shortness of breath, palpitations, numbness or tingling.     Objective :  Temp:  [96.9 °F (36.1 °C)-98.2 °F (36.8 °C)] 97.5 °F (36.4 °C)  HR:  [74-90] 77  BP: (126-182)/(57-83) 126/65  Resp:  [12-18] 12  SpO2:  [93 %-98 %] 96 %  O2 Device: None (Room air)    I/O         02/09 0701  02/10 0700 02/10 0701  02/11 0700 02/11 0701  02/12 0700    P.O.  480     IV Piggyback  50     Total Intake(mL/kg)  530 (6.9)     Net  +530            Unmeasured Urine  Occurrence  2 x     Unmeasured Stool Occurrence  0 x             Physical Exam  Constitutional:       General: She is not in acute distress.     Appearance: Normal appearance.   HENT:      Head: Normocephalic.      Right Ear: External ear normal.      Left Ear: External ear normal.      Nose: Nose normal.      Mouth/Throat:      Mouth: Mucous membranes are moist.      Pharynx: Oropharynx is clear.   Eyes:      General: No scleral icterus.     Pupils: Pupils are equal, round, and reactive to light.   Cardiovascular:      Rate and Rhythm: Normal rate.      Pulses: Normal pulses.   Pulmonary:      Effort: Pulmonary effort is normal. No respiratory distress.      Breath sounds: Normal breath sounds. No wheezing, rhonchi or rales.   Chest:      Chest wall: No tenderness.   Abdominal:      General: Bowel sounds are normal. There is no distension.      Tenderness: There is no abdominal tenderness. There is no guarding or rebound.   Musculoskeletal:         General: Normal range of motion.      Cervical back: Normal range of motion.   Skin:     General: Skin is warm and dry.      Findings: Bruising present.      Comments: Multiple abrasions to posterior scalp, hemostasis obtained. No lacerations, no drainage or spreading erythema on exam.   Neurological:      General: No focal deficit present.      Mental Status: She is alert and oriented to person, place, and time.   Psychiatric:         Mood and Affect: Mood normal.         Behavior: Behavior normal.                Lab Results: I have reviewed the following results:  Recent Labs     02/10/25  1457 02/10/25  1727 02/11/25  0450   WBC  --   --  5.50   HGB  --   --  13.0   HCT  --   --  38.7   PLT  --   --  213   SODIUM  --   --  139   K  --   --  3.5   CL  --   --  102   CO2  --   --  29   BUN  --   --  20   CREATININE  --   --  0.79   GLUC  --   --  130   PTT >210*  --   --    INR  --  0.97  --        Imaging Results Review: No pertinent imaging studies reviewed.  Other  Study Results Review: No additional pertinent studies reviewed.

## 2025-02-12 ENCOUNTER — TRANSITIONAL CARE MANAGEMENT (OUTPATIENT)
Dept: FAMILY MEDICINE CLINIC | Facility: CLINIC | Age: 65
End: 2025-02-12

## 2025-02-12 LAB
BASE EXCESS BLDA CALC-SCNC: 7 MMOL/L (ref -2–3)
CA-I BLD-SCNC: 0.85 MMOL/L (ref 1.12–1.32)
GLUCOSE SERPL-MCNC: 106 MG/DL (ref 65–140)
HCO3 BLDA-SCNC: 24.7 MMOL/L (ref 24–30)
HCT VFR BLD CALC: 42 % (ref 34.8–46.1)
HGB BLDA-MCNC: 14.3 G/DL (ref 11.5–15.4)
PCO2 BLD: 19.1 MM HG (ref 42–50)
PCO2 BLD: 25 MMOL/L (ref 21–32)
PH BLD: 7.72 [PH] (ref 7.3–7.4)
PO2 BLD: 129 MM HG (ref 35–45)
POTASSIUM BLD-SCNC: >8 MMOL/L (ref 3.5–5.3)
SAO2 % BLD FROM PO2: 100 % (ref 60–85)
SODIUM BLD-SCNC: 127 MMOL/L (ref 136–145)
SPECIMEN SOURCE: ABNORMAL